# Patient Record
Sex: FEMALE | Race: WHITE | Employment: OTHER | URBAN - NONMETROPOLITAN AREA
[De-identification: names, ages, dates, MRNs, and addresses within clinical notes are randomized per-mention and may not be internally consistent; named-entity substitution may affect disease eponyms.]

---

## 2020-09-03 ENCOUNTER — HOSPITAL ENCOUNTER (OUTPATIENT)
Dept: MAMMOGRAPHY | Age: 60
Discharge: HOME | End: 2020-09-03
Payer: MEDICARE

## 2020-09-03 PROCEDURE — 77063 BREAST TOMOSYNTHESIS BI: CPT

## 2020-09-14 ENCOUNTER — HOSPITAL ENCOUNTER (OUTPATIENT)
Dept: WOMENS IMAGING | Age: 60
Discharge: HOME | End: 2020-09-14
Payer: MEDICARE

## 2020-09-14 PROCEDURE — 76642 ULTRASOUND BREAST LIMITED: CPT

## 2020-09-14 PROCEDURE — G0279 TOMOSYNTHESIS, MAMMO: HCPCS

## 2021-04-26 LAB
AVERAGE GLUCOSE: NORMAL
CHOLESTEROL, TOTAL: 159 MG/DL
CHOLESTEROL/HDL RATIO: 3.2
HBA1C MFR BLD: 5.4 %
HDLC SERPL-MCNC: 49 MG/DL (ref 35–70)
LDL CHOLESTEROL CALCULATED: 84 MG/DL (ref 0–160)
NONHDLC SERPL-MCNC: NORMAL MG/DL
TRIGL SERPL-MCNC: 128 MG/DL
VLDLC SERPL CALC-MCNC: 26 MG/DL

## 2022-06-15 LAB
ALBUMIN SERPL-MCNC: 4.7 G/DL
ALP BLD-CCNC: 87 U/L
ALT SERPL-CCNC: 15 U/L
ANION GAP SERPL CALCULATED.3IONS-SCNC: 13 MMOL/L
AST SERPL-CCNC: 18 U/L
BILIRUB SERPL-MCNC: 0.8 MG/DL (ref 0.1–1.4)
BUN BLDV-MCNC: 11 MG/DL
CALCIUM SERPL-MCNC: 9.3 MG/DL
CHLORIDE BLD-SCNC: 103 MMOL/L
CHOLESTEROL, TOTAL: 197 MG/DL
CHOLESTEROL/HDL RATIO: 3.4
CO2: 25 MMOL/L
CREAT SERPL-MCNC: 0.87 MG/DL
GFR CALCULATED: 72
GLUCOSE BLD-MCNC: 94 MG/DL
HDLC SERPL-MCNC: 58 MG/DL (ref 35–70)
LDL CHOLESTEROL CALCULATED: 116 MG/DL (ref 0–160)
NONHDLC SERPL-MCNC: NORMAL MG/DL
POTASSIUM SERPL-SCNC: 4.2 MMOL/L
SODIUM BLD-SCNC: 141 MMOL/L
TOTAL PROTEIN: 7
TRIGL SERPL-MCNC: 113 MG/DL
VLDLC SERPL CALC-MCNC: 23 MG/DL

## 2022-09-02 ENCOUNTER — HOSPITAL ENCOUNTER (OUTPATIENT)
Dept: MAMMOGRAPHY | Age: 62
Discharge: HOME | End: 2022-09-02
Payer: MEDICARE

## 2022-09-02 DIAGNOSIS — Z12.31 VISIT FOR SCREENING MAMMOGRAM: ICD-10-CM

## 2022-09-02 PROCEDURE — 77063 BREAST TOMOSYNTHESIS BI: CPT

## 2022-09-14 ENCOUNTER — HOSPITAL ENCOUNTER (OUTPATIENT)
Age: 62
Discharge: HOME | End: 2022-09-14
Payer: MEDICARE

## 2022-09-14 ENCOUNTER — HOSPITAL ENCOUNTER (OUTPATIENT)
Dept: GENERAL RADIOLOGY | Age: 62
Discharge: HOME | End: 2022-09-14
Payer: MEDICARE

## 2022-09-14 DIAGNOSIS — R52 PAIN: ICD-10-CM

## 2022-09-14 DIAGNOSIS — M79.672 LEFT FOOT PAIN: ICD-10-CM

## 2022-09-14 PROCEDURE — 73650 X-RAY EXAM OF HEEL: CPT

## 2023-01-04 ENCOUNTER — OFFICE VISIT (OUTPATIENT)
Dept: FAMILY MEDICINE CLINIC | Age: 63
End: 2023-01-04
Payer: MEDICARE

## 2023-01-04 VITALS
OXYGEN SATURATION: 95 % | TEMPERATURE: 97.5 F | SYSTOLIC BLOOD PRESSURE: 128 MMHG | WEIGHT: 143 LBS | RESPIRATION RATE: 10 BRPM | DIASTOLIC BLOOD PRESSURE: 82 MMHG | HEIGHT: 63 IN | HEART RATE: 81 BPM | BODY MASS INDEX: 25.34 KG/M2

## 2023-01-04 DIAGNOSIS — K59.01 SLOW TRANSIT CONSTIPATION: ICD-10-CM

## 2023-01-04 DIAGNOSIS — K90.89 OTHER INTESTINAL MALABSORPTION: ICD-10-CM

## 2023-01-04 DIAGNOSIS — R41.3 MEMORY DIFFICULTIES: ICD-10-CM

## 2023-01-04 DIAGNOSIS — G47.9 SLEEP DIFFICULTIES: ICD-10-CM

## 2023-01-04 DIAGNOSIS — H92.09 EAR ACHE: ICD-10-CM

## 2023-01-04 DIAGNOSIS — R53.83 TIRED: Primary | ICD-10-CM

## 2023-01-04 DIAGNOSIS — H53.9 VISION ABNORMALITIES: ICD-10-CM

## 2023-01-04 DIAGNOSIS — E78.89 LIPIDS ABNORMAL: ICD-10-CM

## 2023-01-04 DIAGNOSIS — T45.2X1S VITAMIN D OVERDOSE, ACCIDENTAL OR UNINTENTIONAL, SEQUELA: ICD-10-CM

## 2023-01-04 DIAGNOSIS — K90.89 OTHER SPECIFIED INTESTINAL MALABSORPTION: ICD-10-CM

## 2023-01-04 DIAGNOSIS — R09.81 SINUS CONGESTION: ICD-10-CM

## 2023-01-04 DIAGNOSIS — R79.89 LOW SERUM VITAMIN D: ICD-10-CM

## 2023-01-04 DIAGNOSIS — R52 ACHES: ICD-10-CM

## 2023-01-04 DIAGNOSIS — R73.09 LOW GLUCOSE LEVEL: ICD-10-CM

## 2023-01-04 DIAGNOSIS — Z71.89 ENCOUNTER FOR HERB AND VITAMIN SUPPLEMENT MANAGEMENT: ICD-10-CM

## 2023-01-04 PROCEDURE — G8427 DOCREV CUR MEDS BY ELIG CLIN: HCPCS | Performed by: FAMILY MEDICINE

## 2023-01-04 PROCEDURE — 3017F COLORECTAL CA SCREEN DOC REV: CPT | Performed by: FAMILY MEDICINE

## 2023-01-04 PROCEDURE — 99205 OFFICE O/P NEW HI 60 MIN: CPT | Performed by: FAMILY MEDICINE

## 2023-01-04 PROCEDURE — G8419 CALC BMI OUT NRM PARAM NOF/U: HCPCS | Performed by: FAMILY MEDICINE

## 2023-01-04 PROCEDURE — G8484 FLU IMMUNIZE NO ADMIN: HCPCS | Performed by: FAMILY MEDICINE

## 2023-01-04 PROCEDURE — 4004F PT TOBACCO SCREEN RCVD TLK: CPT | Performed by: FAMILY MEDICINE

## 2023-01-04 RX ORDER — HYDROXYZINE HYDROCHLORIDE 25 MG/1
25 TABLET, FILM COATED ORAL 3 TIMES DAILY PRN
COMMUNITY

## 2023-01-04 RX ORDER — BUPROPION HYDROCHLORIDE 150 MG/1
TABLET, EXTENDED RELEASE ORAL
COMMUNITY
Start: 2022-09-29

## 2023-01-04 RX ORDER — LISINOPRIL AND HYDROCHLOROTHIAZIDE 20; 12.5 MG/1; MG/1
TABLET ORAL
COMMUNITY
Start: 2022-09-27

## 2023-01-04 RX ORDER — VITAMIN B COMPLEX
1000 TABLET ORAL DAILY
COMMUNITY

## 2023-01-04 RX ORDER — GABAPENTIN 300 MG/1
300 CAPSULE ORAL 3 TIMES DAILY
COMMUNITY

## 2023-01-04 SDOH — ECONOMIC STABILITY: FOOD INSECURITY: WITHIN THE PAST 12 MONTHS, YOU WORRIED THAT YOUR FOOD WOULD RUN OUT BEFORE YOU GOT MONEY TO BUY MORE.: NEVER TRUE

## 2023-01-04 SDOH — ECONOMIC STABILITY: FOOD INSECURITY: WITHIN THE PAST 12 MONTHS, THE FOOD YOU BOUGHT JUST DIDN'T LAST AND YOU DIDN'T HAVE MONEY TO GET MORE.: NEVER TRUE

## 2023-01-04 ASSESSMENT — ENCOUNTER SYMPTOMS
BACK PAIN: 1
SINUS PAIN: 1
COUGH: 0
COLOR CHANGE: 1
SHORTNESS OF BREATH: 0
CONSTIPATION: 1
WHEEZING: 0

## 2023-01-04 ASSESSMENT — PATIENT HEALTH QUESTIONNAIRE - PHQ9
SUM OF ALL RESPONSES TO PHQ QUESTIONS 1-9: 0
2. FEELING DOWN, DEPRESSED OR HOPELESS: 0
1. LITTLE INTEREST OR PLEASURE IN DOING THINGS: 0
SUM OF ALL RESPONSES TO PHQ9 QUESTIONS 1 & 2: 0
SUM OF ALL RESPONSES TO PHQ QUESTIONS 1-9: 0

## 2023-01-04 ASSESSMENT — SOCIAL DETERMINANTS OF HEALTH (SDOH): HOW HARD IS IT FOR YOU TO PAY FOR THE VERY BASICS LIKE FOOD, HOUSING, MEDICAL CARE, AND HEATING?: NOT VERY HARD

## 2023-01-04 NOTE — PROGRESS NOTES
[address] [facility] [place] [address] [address]  Dept: [phone]  Dept Fax: [phone]  Loc: [phone]      Hemal Hyatt is a 65 y.o. White female. Zac Hernandes  presents to the [place] [address] clinic today for   Chief Complaint   Patient presents with    New Patient     WEE PROTOCOL- last labs at [facility]    Memory Loss    Congestion     sinuses    Anxiety    Depression    Sleep Problem     RLS   , and;   1. Tired    2. Memory difficulties    3. Sleep difficulties    4. Aches    5. Vision abnormalities    6. Encounter for herb and vitamin supplement management    7. Slow transit constipation    8. Sinus congestion    9. Ear ache    10. Lipids abnormal    11. Vitamin D overdose, accidental or unintentional, sequela    12. Other specified intestinal malabsorption    13. Low glucose level    14. Low serum vitamin D    15. Other intestinal malabsorption          I have reviewed Jennifer Gupta medical, surgical and other pertinent history in detail, and have updated medication and allergy information in the computerized patient record. Clinical Care Team:     -Referring Provider for today's consult: self  -Primary Care Provider: Rossy Bejarano MD    Medical/Surgical History:   She  has no past medical history on file. Her  has no past surgical history on file. Family/Social History:     Her family history is not on file. She  reports that she has been smoking cigarettes. She started smoking about 42 years ago. She has a 8.75 pack-year smoking history. She has never used smokeless tobacco. She reports that she does not currently use alcohol. She reports current drug use. Drug: Marijuana Juanetta Mahnomen).     Medications/Allergies/Immunizations:     Her current medication(s) include   Current Outpatient Medications:     buPROPion (WELLBUTRIN SR) 150 MG extended release tablet, take 1 tablet by mouth twice a day, Disp: , Rfl: lisinopril-hydroCHLOROthiazide (PRINZIDE;ZESTORETIC) 20-12.5 MG per tablet, take 1 tablet by mouth once daily, Disp: , Rfl:     gabapentin (NEURONTIN) 300 MG capsule, Take 300 mg by mouth 3 times daily. , Disp: , Rfl:     hydrOXYzine HCl (ATARAX) 25 MG tablet, Take 25 mg by mouth 3 times daily as needed for Itching, Disp: , Rfl:     Vitamin D (CHOLECALCIFEROL) 25 MCG (1000 UT) TABS tablet, Take 1,000 Units by mouth daily, Disp: , Rfl:   Allergies: Patient has no known allergies. Immunizations: There is no immunization history on file for this patient. History of Present Illness:     Jennifer's had concerns including New Patient (WEE PROTOCOL- last labs at [facility]), Memory Loss, Congestion (sinuses), Anxiety, Depression, and Sleep Problem (RLS). Emma White  presents to the [address] [place] [address] today for;   Chief Complaint   Patient presents with    New Patient     WEE PROTOCOL- last labs at [facility]    Memory Loss    Congestion     sinuses    Anxiety    Depression    Sleep Problem     RLS   , abnormal labs follow up and these conditions as she  Is looking today for:     1. Tired    2. Memory difficulties    3. Sleep difficulties    4. Aches    5. Vision abnormalities    6. Encounter for herb and vitamin supplement management    7. Slow transit constipation    8. Sinus congestion    9. Ear ache    10. Lipids abnormal    11. Vitamin D overdose, accidental or unintentional, sequela    12. Other specified intestinal malabsorption    13. Low glucose level    14. Low serum vitamin D    15. Other intestinal malabsorption      HPI    Subjective:     Review of Systems   HENT:  Positive for congestion and sinus pain. Eyes:  Positive for visual disturbance. Respiratory:  Negative for cough, shortness of breath and wheezing. Cardiovascular:  Positive for chest pain, palpitations and leg swelling. Gastrointestinal:  Positive for constipation. Endocrine: Negative.     Genitourinary:  Positive for frequency. Musculoskeletal:  Positive for back pain and myalgias. Skin:  Positive for color change. Allergic/Immunologic: Positive for environmental allergies. Neurological:  Positive for dizziness, weakness, light-headedness and numbness. Hematological:  Bruises/bleeds easily. Psychiatric/Behavioral:  Positive for decreased concentration, dysphoric mood and sleep disturbance. The patient is nervous/anxious. All other systems reviewed and are negative. Objective:     /82 (Site: Left Upper Arm, Position: Sitting, Cuff Size: Medium Adult)   Pulse 81   Temp 97.5 °F (36.4 °C) (Temporal)   Resp 10   Ht 5' 3\" (1.6 m)   Wt 143 lb (64.9 kg)   SpO2 95%   BMI 25.33 kg/m²   Physical Exam  Vitals and nursing note reviewed. Constitutional:       Appearance: Normal appearance. HENT:      Head: Normocephalic. Pulmonary:      Effort: Pulmonary effort is normal.   Neurological:      Mental Status: She is alert. Psychiatric:         Mood and Affect: Mood normal.         Thought Content: Thought content normal.          Laboratory Data:   Lab results were searched in Care Everywhere and/or those brought by the pateint were reviewed today with Jennifer and she has a copy of their most recent labs to take home with them as noted below;       Imaging Data:   Imaging Data:       Assessment & Plan:       Impression:  1. Tired    2. Memory difficulties    3. Sleep difficulties    4. Aches    5. Vision abnormalities    6. Encounter for herb and vitamin supplement management    7. Slow transit constipation    8. Sinus congestion    9. Ear ache    10. Lipids abnormal    11. Vitamin D overdose, accidental or unintentional, sequela    12. Other specified intestinal malabsorption    13. Low glucose level    14. Low serum vitamin D    15.  Other intestinal malabsorption      Assessment and Plan:  After reviewing the patients chief complaints, reviewing their labfindings in great detail (with the patient and those accompanying them) which correlate to their chief complaints, symptoms, and or medical conditions; suggestions were made relating to changes in diet and or supplements which may improve the complaints and which will be reflected in their future lab findings; Chief Complaint   Patient presents with    New Patient     WEE PROTOCOL- last labs at [facility] lab    Memory Loss    Congestion     sinuses    Anxiety    Depression    Sleep Problem     RLS   ;    Plans for the next visits:  - Abnormal and non-optimal Labs were ordered today to be repeated in the next 120-365 days to assess changes from adjustments in nutrition and or nutrients. - Patient instructed when having a blood draw to ask the  to divide their lab draws into multiple draws over several days if not feeling good at the time of the lab draw or if either prefers to do several smaller blood draws over several days  -Patient instructed to check with insurer before each lab draw and to go to the lab which the insurer directs them for the most cost effective lab draw with the least patient's cost  - Thedore Blade  will be scheduled subsequent to those results. - Thedore Blade will bring in her drink, food, supplement log to her next visit    Chronic Problems Addressed on this Visit:                                   1.  Intensity of Service; Uncontrolled items at this visit; Chief Complaint   Patient presents with    New Patient     WEE PROTOCOL- last labs at [facility] lab    Memory Loss    Congestion     sinuses    Anxiety    Depression    Sleep Problem     RLS   ; Improved items at this visit and Stable items were discussed at this visit;  2.  Patients food, drinks, supplements and symptoms were reviewed with the patient,       - Thedore Blade will bring food, drink, supplements and symptoms log to next visit for inclusion in their record      - 75 better food list reviewed & given to patient with the omega 6 food list to avoid      - The 52 Latex foods list was reviewed and given to the patients with the information on carrageenan         - Gluten in corn and oats abstracts sheet reviewed and given to the patient today   3. Greater than 60 minutes time was spent with the patient face to face on this visit; of which >50% was for counseling and coordination of care, as well as the time spent before and after the visit reviewing the chart, documenting the encounter, reviewing labs,reports, NIH listed studies, making phone calls, etc.      Patients food and drinks were reviewed with the patient,   - They will bring a food drink symptom log to future visits for inclusion in their record    - 75 better food list reviewed & given to patient along with the omega 6 food list to avoid      - Gluten in corn and oats abstracts sheet reviewed and given to the patient today    - 23 Foods containing Latex-like proteins was reviewed and copy to be taken if desired     - Nutrient Supplements list provided and copyto be taken if desired    - [id number]. [facility] web site offered to patient to review at their convenience by staff with login information    Note:  I have discussed with the patient that with all nutraceuticals, there is often mixed data and emerging research which needs to be monitored; as well as an array of NIH fact sheets on nutrients and supplements, available at www.nih,issue plus deeplocallabs. com plus www.lpi,org. If I have recommended cinnamon at the request of this patient to assist them in control of their blood sugar, triglyceride, and/or weight issues. I discussed that the patient's clinical use of cinnamon bark, calcium, magnesium, Vitamin D, and pharmaceutical grade [employer] omega 3 oil or triple-strength fish oil, and B-50/B-100 time-released B-complex by [address] [facility] will be for a time-limited trial to determine their individual effectiveness and safety in this patient.   I also referred the patient to the NMCD: Nutrition, Metabolism, and Cardiovascular Diseases (SecuritiesCard.pl) and concerns about long-term use and hepatotoxicity of cinnamon and other nutrients. I suggested they frequently search nih.gov for the latest non-proprietary information on nutriceuticals as well as consider a subscription to [employer] for details on reviewed supplements, or at the least review the nutrient files at [facility], [address] bark, an insulin mimetic, reduces some High Carbohydrate Dietary Impacts. Methylhydroxychalcone polymers insulin-enhancing properties in fat cells are responsible for enhanced glucose uptake, inhibiting hepatic HMG-CoA reductase and lowers lipids. www.jacn. org/content/20/4/327.full     But cinnamon with additives such as Cinnamon Extract are not effective as insulin mimetics.  :eStoreDirectory.at     Nutrients for Start up from [employer] or [employer] for ease to get started now;  Josie Dawkins has some useable products;  - Triple Strength Fish Oil, enteric coated  - Vit D-3 5000 IU gel caps  - Iron ferrous sulfate 325 mg tabs  - Centrum Silver look-a-like for most patients, or  - Centrum plain look-a-like if need iron    Local pharmacies or chains such as [facility], [place], have:  - [employer] pharmaceutical grade omega 3 is 90% EPA/DHA whereas most Triple strength fish oil are 75% EPA/DHA  - Triple Strength Fish Oil (enteric coated if available) or if not enteric coated, can take from freezer for less burps  - B-50 or B-100 released balanced B complex tabs by [address] [facility] bark 500 mg (without Chromium or extracts)   some brands list 1000 mg / serving of 2 capsules,    some brands have 1000 mg caps with the undesireable chromium extract  - Calcium carbonate/citrate, magnesium oxide/citrate, Vit D-3 as 3-4 tabs/caps/serving     Some Local Brands may contain Zinc which is acceptable for the first bottle or two  - Magnesium oxide 250 mg tabs for those having < 2 bowel movements daily  - Magnesium citrate 200 mg if having > 2 bowel movements/day  - Centrum Silver or look-a-like for most patients, Centrum plain or look-a-like with iron  - Vitamin D-3 comes as 1,000 IU or 2,000 IU or 5,000 IU gel caps or Liquid drops but keep Vitamin D levels <50 but >40     Some brands containing or derived from soy oil or corn oil are OK if not allergic to soy  - Elemental Iron 65 mg tabs at bedtime is available over the counter if need more iron     Usually turns bowel movements grey, green, or black but not a concern  - Apricot Kernel Oil (by Now) for dry skin sensitive perineal or perianal area skin    Nutrients for ongoing use by Mail order for less expense from [employer] ;  - Strength Fish Oil , 240 Softgels Item #[id number]  -B-100 time released balanced B complex Item #[id number]  - Cinnamon bark 500 mg without Chromium or extract Item #109636  - Calcium carbonate 1000 mg, Magnesium oxide 500 mg, Vit D-3 400 IU Item #[id number]  - Magnesium oxide 500 mg tabs Item #971346 if less than 2 bowel movements daily  - [employer] Sen[employer] Item #719483 for most patients, One Daily Item #809336 with iron  - Vit D 3  1,000 Item #266336      2,000 IU Item #990213   Item #925397     Some brands containing orderived from soy oil or corn oil are OK if not allergic to soy    Nutrients for Special Needs by Mail order for less expense from www. puritan.com;  -Elemental Iron 65 mg tabs Item #295185 if need more iron for low iron on labs    Usually turns bowel movements grey, green or black but not a concern  - Time released Niacin 250 mg Item #429861 for cold intolerance, low libido or impotence  - DHEA 50 mg Item #469219 for improving DHEA levels on labs if having Fatigue    If stools too loose substitute for your Magnesium oxide using;   Magnesium citrate 200 mg tabs (NOT liquid) at [employer]   Magnesium gluconate 550 mg by mLED at [employer] or [employer]  Magnesium chloride foot soaks or body sprays  www.Therative   Magnesium chloride flakes 14.99 Item #: [id number] if back-ordered, get spray  Magnesium threonate, Magtein also helps mental clarity and sleep    Food Drink Symptom Log;  I asked this patient to track these items and any other symptoms on their list on a weekly basis to documenttheir progress or lack of same. This can be done on the symptom tracking sheet I gave them at today's visit but looks like this:                                                      Rate on scale of 0-10 with zero = not noticeable  Symptom:                            Week 1               2                 3                 4               Etc            Hair loss    Foot cramps    Paresthesia    Aches    IBS (irritable bowel)    Constipation    Diarrhea  Nocturia (up to bathroom at night)    Fatigue/Energy level  Stress      On the other side of the sheet they can track their food, drink, environment, activity, symptoms etc      Avoiding Latex-like proteins in my foods; Avocados, Bananas, Celery, Figs & Kiwi proteins have latex-like proteins to inflame our immune systems, plus 47 more foods  How Can I Have A Latex Allergy? Eating foods with latex-like protein exposes us to latex allergies. Our body cannot tell the differencebetween these latex-like proteins and latex from rubber products since many people are allergic to fruit, vegetables and latex. Read labels on pre-packaged foods. This list to avoid is only a guide if you are known allergicto latex or have a latex rash on your chin, cheeks and lines on your neck and chest. The amount of latex is different in each food product or fruit variety. Avoid out of Season if not grown locally:   Melon, Nectarine, Papaya, Cherry, Passion fruit, Plum, Chestnuts, and Tomato. Avocado, Banana, Celery, Figs, and Kiwi always contain Latex-like protein. Whats in Season?   Strawberries taste better in June than December because June is strawberry season so buy locally grown produce \"in season\" for the best flavor, cost, and less Latex. Locally grown produce not only tastes great but also requires little or no ethylene exposure in food distribution so has less latex content. Out of season: use canned, frozen, or dried since those are processed ripe and latex content is lower!!!     Month     [place] Locally Grown Produce  January, February, March: use canned, frozen or dried fruits since lower in latex  April: asparagus, radishes  May: asparagus, broccoli, green onions, greens, peas, radishes, rhubarb  Fabienne: asparagus, beets, beans, broccoli, cabbage, cantaloupe, carrots, green onions, greens, lettuce, onions, parsley, peas, radishes, rhubarb, strawberries, watermelons  July: beans, beets, blueberries, broccoli, cabbage, cantaloupe, carrots, cauliflower, celery, cucumbers, eggplant, grapes, green onions, greens, lettuce, onions, parsley, peas, peaches, bell peppers, potatoes, radishes, summer raspberries, squash, sweetcorn, tomatoes, turnips, watermelons  August: apples, beans, beets, blueberries, cabbage, cantaloupe, carrots, cauliflower, celery, cucumbers, eggplant, grapes, green onions, greens, lettuce, onions, parsley, peas, peaches, pears, bell peppers, potatoes, radishes, squash, sweet corn, tomatoes, turnips, watermelons  September: apples, beans, beets, blueberries, cabbage, cantaloupe, carrots, cauliflower, celery, cucumbers, eggplant, grapes, green onions, greens, lettuce, onions, parsley, peas, peaches, pears, bell peppers, plums, potatoes, pumpkins, radishes, fall red raspberries, squash, sweet corn, tomatoes, turnips, watermelons  October: apples, beets, broccoli, cabbage, carrots, cauliflower, celery, green onions, greens, lettuce, parsley, peas, pears, potatoes, pumpkins, radishes, fall red raspberries, squash, turnips  November: broccoli, cabbage, carrots, parsley, pears, peas  December: use canned, frozen or dried fruits since lower in latex    Upto half of latex-sensitive patients show allergic reactions to fruits (avocados, bananas, kiwifruits, papayas, peaches),   Annals of Allergy, 1994. These plants contain the same proteins that are allergens in latex. People with fruit allergies should warn physicians before undergoing procedures which may cause anaphylactic reaction if in contact with latex gloves. Some of the common foods with defined cross-reactivity to latex are avocado, banana, kiwi, chestnut, raw potato, tomato, stone fruits (e.g., peach, cherry), hazelnut, melons, celery, carrot, apple, pear, papaya, and almond. Foods with less well-defined cross-reactivity to latex are peanuts, peppers, citrus fruits, coconut, pineapple, kait, fig, passion fruit, Ugli fruit, and grape. This fruit/latex cross-reactivity is worsened by ethylene, a gas used to hasten commercial ripening. In nature, plants produce low levels of the hormone ethylene, which regulates germination, flowering, and ripening. Forced ripening by high ethylene concentrations, plants produce allergenic wound-repair proteins, which are similar to wound-repair proteins made during the tapping of rubber trees. Sensitive individuals who ingest the fruit get a higher dose and worse reaction. Some people may even first become sensitized to latex through fruit. Can food processing increase the concentrations of allergenic proteins? Latex-sensitized children (and adults) in [place] often experience allergic reactions after eating bananas ripened artificially with ethylene. In the [facility], food distribution centers treat unripe bananas and other produce with ethylene to ripen; not commonly done in [facility] since fruit is tree-ripened there. Does treatment of food with ethylene induce banana proteins that cross-react with latex?  (Marcia et al.)    References:   Latex in Foods Allergy, http://ehp.niehs.nih.gov/members/2003/5811/5811.html    Search web for \" Whats in Season \" for where you live or are at the time you food shop   Management of Latex, ://medicalcenter. o[employer].edu/  search for nih, latex-like proteins in foods

## 2023-01-04 NOTE — PATIENT INSTRUCTIONS
Thank you   Thank you for trusting us with your healthcare needs. You may receive a survey regarding today's visit. It would help us out if you would take a few moments to provide your feedback. We value your input. Please bring in ALL medications BOTTLES, including inhalers, herbal supplements, over the counter, prescribed & non-prescribed medicine. The office would like actual medication bottles and a list.   Please note our OFFICE POLICIES:   Prior to getting your labs drawn, please check with your insurance company for benefits and eligibility of lab services. Often, insurance companies cover certain tests for preventative visits only. It is patient's responsibility to see what is covered. We are unable to change a diagnosis after the test has been performed. Lab orders will not be re-printed. Please hold onto your original lab orders and take them to your lab to be completed. If you no show your scheduled appointment three times, you will be dismissed from this practice. Reschedules must be completed 24 hours prior to your schedule appointment. If the list below has been completed, PLEASE FAX RECORDS TO OUR OFFICE @ [phone].  Once the records have been received we will update your records at our office:  Health Maintenance Due   Topic Date Due    COVID-19 Vaccine (1) Never done    Pneumococcal 0-64 years Vaccine (1 - PCV) Never done    Depression Screen  Never done    HIV screen  Never done    Hepatitis C screen  Never done    DTaP/Tdap/Td vaccine (1 - Tdap) Never done    Cervical cancer screen  Never done    Colorectal Cancer Screen  Never done    Shingles vaccine (1 of 2) Never done    Annual Wellness Visit (AWV)  Never done    Flu vaccine (1) Never done

## 2023-01-11 LAB
AVERAGE GLUCOSE: 108
HBA1C MFR BLD: 5.4 %

## 2023-02-01 ENCOUNTER — OFFICE VISIT (OUTPATIENT)
Dept: FAMILY MEDICINE CLINIC | Age: 63
End: 2023-02-01
Payer: MEDICARE

## 2023-02-01 ENCOUNTER — PATIENT MESSAGE (OUTPATIENT)
Dept: FAMILY MEDICINE CLINIC | Age: 63
End: 2023-02-01

## 2023-02-01 VITALS
RESPIRATION RATE: 12 BRPM | WEIGHT: 145.2 LBS | DIASTOLIC BLOOD PRESSURE: 76 MMHG | HEART RATE: 81 BPM | SYSTOLIC BLOOD PRESSURE: 136 MMHG | TEMPERATURE: 97.5 F | OXYGEN SATURATION: 96 % | BODY MASS INDEX: 25.73 KG/M2 | HEIGHT: 63 IN

## 2023-02-01 DIAGNOSIS — T45.2X1S VITAMIN D OVERDOSE, ACCIDENTAL OR UNINTENTIONAL, SEQUELA: ICD-10-CM

## 2023-02-01 DIAGNOSIS — G47.9 SLEEP DIFFICULTIES: ICD-10-CM

## 2023-02-01 DIAGNOSIS — R79.9 ABNORMAL BLOOD FINDINGS: ICD-10-CM

## 2023-02-01 DIAGNOSIS — E61.2: ICD-10-CM

## 2023-02-01 DIAGNOSIS — H53.9 VISION ABNORMALITIES: ICD-10-CM

## 2023-02-01 DIAGNOSIS — K90.89 OTHER SPECIFIED INTESTINAL MALABSORPTION: ICD-10-CM

## 2023-02-01 DIAGNOSIS — R53.83 TIRED: Primary | ICD-10-CM

## 2023-02-01 DIAGNOSIS — R41.3 MEMORY DIFFICULTIES: ICD-10-CM

## 2023-02-01 DIAGNOSIS — E78.89 LIPIDS ABNORMAL: Primary | ICD-10-CM

## 2023-02-01 DIAGNOSIS — K59.01 SLOW TRANSIT CONSTIPATION: ICD-10-CM

## 2023-02-01 DIAGNOSIS — Z71.89 ENCOUNTER FOR HERB AND VITAMIN SUPPLEMENT MANAGEMENT: ICD-10-CM

## 2023-02-01 DIAGNOSIS — R52 ACHES: ICD-10-CM

## 2023-02-01 DIAGNOSIS — E78.89 LIPIDS ABNORMAL: ICD-10-CM

## 2023-02-01 PROCEDURE — G8484 FLU IMMUNIZE NO ADMIN: HCPCS | Performed by: FAMILY MEDICINE

## 2023-02-01 PROCEDURE — 4004F PT TOBACCO SCREEN RCVD TLK: CPT | Performed by: FAMILY MEDICINE

## 2023-02-01 PROCEDURE — G8427 DOCREV CUR MEDS BY ELIG CLIN: HCPCS | Performed by: FAMILY MEDICINE

## 2023-02-01 PROCEDURE — 99213 OFFICE O/P EST LOW 20 MIN: CPT | Performed by: FAMILY MEDICINE

## 2023-02-01 PROCEDURE — 3017F COLORECTAL CA SCREEN DOC REV: CPT | Performed by: FAMILY MEDICINE

## 2023-02-01 PROCEDURE — G8419 CALC BMI OUT NRM PARAM NOF/U: HCPCS | Performed by: FAMILY MEDICINE

## 2023-02-01 RX ORDER — CHLORAL HYDRATE 500 MG
1 CAPSULE ORAL
COMMUNITY

## 2023-02-01 RX ORDER — NAPROXEN 500 MG/1
TABLET ORAL DAILY PRN
COMMUNITY
Start: 2023-01-09

## 2023-02-01 SDOH — ECONOMIC STABILITY: HOUSING INSECURITY
IN THE LAST 12 MONTHS, WAS THERE A TIME WHEN YOU DID NOT HAVE A STEADY PLACE TO SLEEP OR SLEPT IN A SHELTER (INCLUDING NOW)?: NO

## 2023-02-01 SDOH — ECONOMIC STABILITY: INCOME INSECURITY: HOW HARD IS IT FOR YOU TO PAY FOR THE VERY BASICS LIKE FOOD, HOUSING, MEDICAL CARE, AND HEATING?: NOT VERY HARD

## 2023-02-01 SDOH — ECONOMIC STABILITY: FOOD INSECURITY: WITHIN THE PAST 12 MONTHS, YOU WORRIED THAT YOUR FOOD WOULD RUN OUT BEFORE YOU GOT MONEY TO BUY MORE.: NEVER TRUE

## 2023-02-01 SDOH — ECONOMIC STABILITY: FOOD INSECURITY: WITHIN THE PAST 12 MONTHS, THE FOOD YOU BOUGHT JUST DIDN'T LAST AND YOU DIDN'T HAVE MONEY TO GET MORE.: NEVER TRUE

## 2023-02-01 ASSESSMENT — ENCOUNTER SYMPTOMS: EYE REDNESS: 1

## 2023-02-01 NOTE — PROGRESS NOTES
[address] [facility] [place] [address] [address]  Dept: [phone]  Dept Fax: [phone]  Loc: [phone]      Omayra Waller is a 65 y.o. White female. Shanel Potter  presents to the [place] [address] clinic today for   Chief Complaint   Patient presents with    Discuss Labs    Follow-up   , and;   1. Tired    2. Memory difficulties    3. Sleep difficulties    4. Aches    5. Vision abnormalities    6. Encounter for herb and vitamin supplement management    7. Slow transit constipation    8. Lipids abnormal    9. Vitamin D overdose, accidental or unintentional, sequela    10. Other specified intestinal malabsorption    11. Inadequate magnesium intake          I have reviewed Jennifer Gupta medical, surgical and other pertinent history in detail, and have updated medication and allergy information in the computerized patient record. Clinical Care Team:     -Referring Provider for today's consult: self  -Primary Care Provider: Laila Borden MD    Medical/Surgical History:   She  has no past medical history on file. Her  has no past surgical history on file. Family/Social History:     Her family history is not on file. She  reports that she has been smoking cigarettes. She started smoking about 42 years ago. She has a 8.75 pack-year smoking history. She has never used smokeless tobacco. She reports that she does not currently use alcohol. She reports current drug use. Drug: Marijuana Marcio Salazar).     Medications/Allergies/Immunizations:     Her current medication(s) include   Current Outpatient Medications:     naproxen (NAPROSYN) 500 MG tablet, daily as needed, Disp: , Rfl:     Omega-3 1000 MG CAPS, Take 1 capsule by mouth 4 times daily (before meals and nightly) [facility] omega  = anxiety, depression, BP, stress, Disp: , Rfl:     buPROPion (WELLBUTRIN SR) 150 MG extended release tablet, take 1 tablet by mouth twice a day, Disp: , Rfl:     lisinopril-hydroCHLOROthiazide (PRINZIDE;ZESTORETIC) 20-12.5 MG per tablet, take 1 tablet by mouth once daily, Disp: , Rfl:     gabapentin (NEURONTIN) 300 MG capsule, Take 300 mg by mouth 3 times daily. , Disp: , Rfl:     hydrOXYzine HCl (ATARAX) 25 MG tablet, Take 25 mg by mouth 3 times daily as needed for Itching, Disp: , Rfl:     Vitamin D (CHOLECALCIFEROL) 25 MCG (1000 UT) TABS tablet, Take 1,000 Units by mouth daily, Disp: , Rfl:   Allergies: Patient has no known allergies. Immunizations: There is no immunization history on file for this patient. History of Present Illness:     Jennifer's had concerns including Discuss Labs and Follow-up. Wes Campa  presents to the [address] [place] [address] today for;   Chief Complaint   Patient presents with    Discuss Labs    Follow-up   , abnormal labs follow up and these conditions as she  Is looking today for:     1. Tired    2. Memory difficulties    3. Sleep difficulties    4. Aches    5. Vision abnormalities    6. Encounter for herb and vitamin supplement management    7. Slow transit constipation    8. Lipids abnormal    9. Vitamin D overdose, accidental or unintentional, sequela    10. Other specified intestinal malabsorption    11. Inadequate magnesium intake      HPI    Subjective:     Review of Systems   HENT:  Positive for congestion. Eyes:  Positive for redness. All other systems reviewed and are negative. Objective:     /76 (Site: Left Upper Arm, Position: Sitting, Cuff Size: Medium Adult)   Pulse 81   Temp 97.5 °F (36.4 °C) (Temporal)   Resp 12   Ht 5' 3\" (1.6 m)   Wt 145 lb 3.2 oz (65.9 kg)   SpO2 96%   BMI 25.72 kg/m²   Physical Exam  Vitals and nursing note reviewed.           Laboratory Data:   Lab results were searched in Care Everywhere and/or those brought by the pateint were reviewed today with Jennifer and she has a copy of their most recent labs to take home with them as noted below;       Imaging Data: Imaging Data:       Assessment & Plan:       Impression:  1. Tired    2. Memory difficulties    3. Sleep difficulties    4. Aches    5. Vision abnormalities    6. Encounter for herb and vitamin supplement management    7. Slow transit constipation    8. Lipids abnormal    9. Vitamin D overdose, accidental or unintentional, sequela    10. Other specified intestinal malabsorption    11. Inadequate magnesium intake      Assessment and Plan:  After reviewing the patients chief complaints, reviewing their labfindings in great detail (with the patient and those accompanying them) which correlate to their chief complaints, symptoms, and or medical conditions; suggestions were made relating to changes in diet and or supplements which may improve the complaints and which will be reflected in their future lab findings; Chief Complaint   Patient presents with    Discuss Labs    Follow-up   ;    Plans for the next visits:  - Abnormal and non-optimal Labs were ordered today to be repeated in the next 120-365 days to assess changes from adjustments in nutrition and or nutrients. - Patient instructed when having a blood draw to ask the  to divide their lab draws into multiple draws over several days if not feeling good at the time of the lab draw or if either prefers to do several smaller blood draws over several days  -Patient instructed to check with insurer before each lab draw and to go to the lab which the insurer directs them for the most cost effective lab draw with the least patient's cost  - Rose Marie Hillman  will be scheduled subsequent to those results. - Rose Marie Hillman will bring in her drink, food, supplement log to her next visit    Chronic Problems Addressed on this Visit:                                   1.  Intensity of Service; Uncontrolled items at this visit; Chief Complaint   Patient presents with    Discuss Labs    Follow-up   ;               Improved items at this visit and Stable items were discussed at this visit; 2. Patients food, drinks, supplements and symptoms were reviewed with the patient,       - Valeri Quigley will bring food, drink, supplements and symptoms log to next visit for inclusion in their record      - 75 better food list reviewed & given to patient with the omega 6 food list to avoid      - The 52 Latex foods list was reviewed and given to the patients with the information on carrageenan         - Gluten in corn and oats abstracts sheet reviewed and given to the patient today   3. Greater than 22 minutes time was spent with the patient face to face on this visit; of which >50% was for counseling and coordination of care, as well as the time spent before and after the visit reviewing the chart, documenting the encounter, reviewing labs,reports, NIH listed studies, making phone calls, etc.      Patients food and drinks were reviewed with the patient,   - They will bring a food drink symptom log to future visits for inclusion in their record    - 75 better food list reviewed & given to patient along with the omega 6 food list to avoid      - Gluten in corn and oats abstracts sheet reviewed and given to the patient today    - 23 Foods containing Latex-like proteins was reviewed and copy to be taken if desired     - Nutrient Supplements list provided and copyto be taken if desired    - [employer] web site offered to patient to review at their convenience by staff with login information    Note:  I have discussed with the patient that with all nutraceuticals, there is often mixed data and emerging research which needs to be monitored; as well as an array of NIH fact sheets on nutrients and supplements, available at www.nih,issue plus [employer]. [employer] plus www.lpi,org. If I have recommended cinnamon at the request of this patient to assist them in control of their blood sugar, triglyceride, and/or weight issues.   I discussed that the patient's clinical use of cinnamon bark, calcium, magnesium, Vitamin D, and pharmaceutical grade [facility] omega 3 oil or triple-strength fish oil, and B-50/B-100 time-released B-complex by [address] will be for a time-limited trial to determine their individual effectiveness and safety in this patient. I also referred the patient to the NMCD: Nutrition, Metabolism, and Cardiovascular Diseases (SecuritiesCard.pl) and concerns about long-term use and hepatotoxicity of cinnamon and other nutrients. I suggested they frequently search nih.gov for the latest non-proprietary information on nutriceuticals as well as consider a subscription to [employer] (ROOF) for details on reviewed supplements, or at the least review the nutrient files at [facility] at [facility], [address] bark, an insulin mimetic, reduces some High Carbohydrate Dietary Impacts. Methylhydroxychalcone polymers insulin-enhancing properties in fat cells are responsible for enhanced glucose uptake, inhibiting hepatic HMG-CoA reductase and lowers lipids. www.jacn. org/content/20/4/327.full     But cinnamon with additives such as Cinnamon Extract are not effective as insulin mimetics.  :eStoreDirectory.at     Nutrients for Start up from [employer] or [employer] for ease to get started now;  Josie Dawkins has some useable products;  - Triple Strength Fish Oil, enteric coated  - Vit D-3 5000 IU gel caps  - Iron ferrous sulfate 325 mg tabs  - Centrum Silver look-a-like for most patients, or  - Centrum plain look-a-like if need iron    Local pharmacies or chains such as [employer], [employer], [employer], have:  - [employer] pharmaceutical grade omega 3 is 90% EPA/DHA whereas most Triple strength fish oil are 75% EPA/DHA  - Triple Strength Fish Oil (enteric coated if available) or if not enteric coated, can take from freezer for less burps  - B-50 or B-100 released balanced B complex tabs by [address] at [facility] bark 500 mg (without Chromium or extracts)   some brands list 1000 mg / serving of 2 capsules,    some brands have 1000 mg caps with the undesireable chromium extract  - Calcium carbonate/citrate, magnesium oxide/citrate, Vit D-3 as 3-4 tabs/caps/serving     Some Local Brands may contain Zinc which is acceptable for the first bottle or two  - Magnesium oxide 250 mg tabs for those having < 2 bowel movements daily  - Magnesium citrate 200 mg if having > 2 bowel movements/day  - Centrum Silver or look-a-like for most patients, Centrum plain or look-a-like with iron  - Vitamin D-3 comes as 1,000 IU or 2,000 IU or 5,000 IU gel caps or Liquid drops but keep Vitamin D levels <50 but >40     Some brands containing or derived from soy oil or corn oil are OK if not allergic to soy  - Elemental Iron 65 mg tabs at bedtime is available over the counter if need more iron     Usually turns bowel movements grey, green, or black but not a concern  - Apricot Kernel Oil (by Now) for dry skin sensitive perineal or perianal area skin    Nutrients for ongoing use by Mail order for less expense from [employer] ;  - Strength Fish Oil , 240 Softgels Item #[id number]  -B-100 time released balanced B complex Item #[id number]  - Cinnamon bark 500 mg without Chromium or extract Item #[id number]  - Calcium carbonate 1000 mg, Magnesium oxide 500 mg, Vit D-3 400 IU Item #[id number]  - Magnesium oxide 500 mg tabs Item #[id number] if less than 2 bowel movements daily  - [employer] Item #[id number] for most patients, One Daily Item #241566 with iron  - Vit D 3  1,000 Item #[id number]      2,000 IU Item #987077   Item #433659     Some brands containing orderived from soy oil or corn oil are OK if not allergic to soy    Nutrients for Special Needs by Mail order for less expense from www. puritan.com;  -Elemental Iron 65 mg tabs Item #687529 if need more iron for low iron on labs    Usually turns bowel movements grey, green or black but not a concern  - Time released Niacin 250 mg Item #551740 for cold intolerance, low libido or impotence  - DHEA 50 mg Item #652454 for improving DHEA levels on labs if having Fatigue    If stools too loose substitute for your Magnesium oxide using;   Magnesium citrate 200 mg tabs (NOT liquid) at [employer]. [place]   Magnesium gluconate 550 mg by Nicolasa at [employer] or [employer]Yeti. com  Magnesium chloride foot soaks or body sprays  www.ideacts innovations. [place]   Magnesium chloride flakes 14.99 Item #: [id number] if back-ordered, get spray  Magnesium threonate, Magtein also helps mental clarity and sleep    Food Drink Symptom Log;  I asked this patient to track these items and any other symptoms on their list on a weekly basis to documenttheir progress or lack of same. This can be done on the symptom tracking sheet I gave them at today's visit but looks like this:                                                      Rate on scale of 0-10 with zero = not noticeable  Symptom:                            Week 1               2                 3                 4               Etc            Hair loss    Foot cramps    Paresthesia    Aches    IBS (irritable bowel)    Constipation    Diarrhea  Nocturia (up to bathroom at night)    Fatigue/Energy level  Stress      On the other side of the sheet they can track their food, drink, environment, activity, symptoms etc      Avoiding Latex-like proteins in my foods; Avocados, Bananas, Celery, Figs & Kiwi proteins have latex-like proteins to inflame our immune systems, plus 47 more foods  How Can I Have A Latex Allergy? Eating foods with latex-like protein exposes us to latex allergies. Our body cannot tell the differencebetween these latex-like proteins and latex from rubber products since many people are allergic to fruit, vegetables and latex. Read labels on pre-packaged foods.  This list to avoid is only a guide if you are known allergicto latex or have a latex rash on your chin, cheeks and lines on your neck and chest. The amount of latex is different in each food product or fruit variety. Avoid out of Season if not grown locally:   Melon, Nectarine, Papaya, Cherry, Passion fruit, Plum, Chestnuts, and Tomato. Avocado, Banana, Celery, Figs, and Kiwi always contain Latex-like protein. Whats in Season? Strawberries taste better in June than December because June is strawberry season so buy locally grown produce \"in season\" for the best flavor, cost, and less Latex. Locally grown produce not only tastes great but also requires little or no ethylene exposure in food distribution so has less latex content. Out of season: use canned, frozen, or dried since those are processed ripe and latex content is lower!!!     Month     [place] Locally Grown Produce  January, February, March: use canned, frozen or dried fruits since lower in latex  April: asparagus, radishes  May: asparagus, broccoli, green onions, greens, peas, radishes, rhubarb  Fabienne: asparagus, beets, beans, broccoli, cabbage, cantaloupe, carrots, green onions, greens, lettuce, onions, parsley, peas, radishes, rhubarb, strawberries, watermelons  July: beans, beets, blueberries, broccoli, cabbage, cantaloupe, carrots, cauliflower, celery, cucumbers, eggplant, grapes, green onions, greens, lettuce, onions, parsley, peas, peaches, bell peppers, potatoes, radishes, summer raspberries, squash, sweetcorn, tomatoes, turnips, watermelons  August: apples, beans, beets, blueberries, cabbage, cantaloupe, carrots, cauliflower, celery, cucumbers, eggplant, grapes, green onions, greens, lettuce, onions, parsley, peas, peaches, pears, bell peppers, potatoes, radishes, squash, sweet corn, tomatoes, turnips, watermelons  September: apples, beans, beets, blueberries, cabbage, cantaloupe, carrots, cauliflower, celery, cucumbers, eggplant, grapes, green onions, greens, lettuce, onions, parsley, peas, peaches, pears, bell peppers, plums, potatoes, pumpkins, radishes, fall red raspberries, squash, sweet corn, tomatoes, turnips, watermelons  October: apples, beets, broccoli, cabbage, carrots, cauliflower, celery, green onions, greens, lettuce, parsley, peas, pears, potatoes, pumpkins, radishes, fall red raspberries, squash, turnips  November: broccoli, cabbage, carrots, parsley, pears, peas  December: use canned, frozen or dried fruits since lower in latex    Upto half of latex-sensitive patients show allergic reactions to fruits (avocados, bananas, kiwifruits, papayas, peaches),   Annals of Allergy, 1994. These plants contain the same proteins that are allergens in latex. People with fruit allergies should warn physicians before undergoing procedures which may cause anaphylactic reaction if in contact with latex gloves. Some of the common foods with defined cross-reactivity to latex are avocado, banana, kiwi, chestnut, raw potato, tomato, stone fruits (e.g., peach, cherry), hazelnut, melons, celery, carrot, apple, pear, papaya, and almond. Foods with less well-defined cross-reactivity to latex are peanuts, peppers, citrus fruits, coconut, pineapple, kait, fig, passion fruit, Ugli fruit, and grape. This fruit/latex cross-reactivity is worsened by ethylene, a gas used to hasten commercial ripening. In nature, plants produce low levels of the hormone ethylene, which regulates germination, flowering, and ripening. Forced ripening by high ethylene concentrations, plants produce allergenic wound-repair proteins, which are similar to wound-repair proteins made during the tapping of rubber trees. Sensitive individuals who ingest the fruit get a higher dose and worse reaction. Some people may even first become sensitized to latex through fruit. Can food processing increase the concentrations of allergenic proteins? Latex-sensitized children (and adults) in [place] often experience allergic reactions after eating bananas ripened artificially with ethylene.  In the [place], food distribution centers treat unripe bananas and other produce with ethylene to ripen; not commonly done in [facility] since fruit is tree-ripened there. Does treatment of food with ethylene induce banana proteins that cross-react with latex? (Marcia et al.)    References:   Latex in Foods Allergy, http://ehp.niehs.nih.gov/members/2003/5811/5811.html    Search web for [employer] in Season \" for where you live or are at the time you food shop   Management of Latex, ://medicalcenter. [facility].edu/  search for nih, latex-like proteins in foods

## 2023-02-01 NOTE — PATIENT INSTRUCTIONS
Thank you   Thank you for trusting us with your healthcare needs. You may receive a survey regarding today's visit. It would help us out if you would take a few moments to provide your feedback. We value your input. Please bring in ALL medications BOTTLES, including inhalers, herbal supplements, over the counter, prescribed & non-prescribed medicine. The office would like actual medication bottles and a list.   Please note our OFFICE POLICIES:   Prior to getting your labs drawn, please check with your insurance company for benefits and eligibility of lab services. Often, insurance companies cover certain tests for preventative visits only. It is patient's responsibility to see what is covered. We are unable to change a diagnosis after the test has been performed. Lab orders will not be re-printed. Please hold onto your original lab orders and take them to your lab to be completed. If you no show your scheduled appointment three times, you will be dismissed from this practice. Reschedules must be completed 24 hours prior to your schedule appointment. If the list below has been completed, PLEASE FAX RECORDS TO OUR OFFICE @ [phone].  Once the records have been received we will update your records at our office:  Health Maintenance Due   Topic Date Due    COVID-19 Vaccine (1) Never done    Pneumococcal 0-64 years Vaccine (1 - PCV) Never done    HIV screen  Never done    Hepatitis C screen  Never done    DTaP/Tdap/Td vaccine (1 - Tdap) Never done    Cervical cancer screen  Never done    Colorectal Cancer Screen  Never done    Shingles vaccine (1 of 2) Never done    Annual Wellness Visit (AWV)  Never done    Flu vaccine (1) Never done

## 2023-02-20 NOTE — TELEPHONE ENCOUNTER
[facility] fish oil will not be filled at [facility] aid where patient wants to go. Please change to a fish oil that is appropriate.

## 2023-02-21 RX ORDER — VITAMIN B COMPLEX
1000 TABLET ORAL DAILY
Qty: 60 TABLET | Refills: 3 | Status: SHIPPED | OUTPATIENT
Start: 2023-02-21

## 2023-04-18 LAB — SEDIMENTATION RATE, ERYTHROCYTE: 16 MM/HR (ref 0–20)

## 2023-04-19 LAB
ABSOLUTE BASO #: 0.06 K/UL (ref 0–0.2)
ABSOLUTE EOS #: 0.08 K/UL (ref 0–0.5)
ABSOLUTE LYMPH #: 1.49 K/UL (ref 1–4)
ABSOLUTE MONO #: 0.54 K/UL (ref 0.2–1)
ABSOLUTE NEUT #: 3.68 K/UL (ref 1.5–7.5)
BASOPHILS RELATIVE PERCENT: 1 %
CALCIUM SERPL-MCNC: 9.5 MG/DL (ref 8.5–10.5)
EOSINOPHILS RELATIVE PERCENT: 1.4 %
ESTRADIOL LEVEL: <25 PG/ML
HCT VFR BLD CALC: 41.1 % (ref 34–45)
HEMOGLOBIN: 14.1 G/DL (ref 11.5–15.5)
HIGH SENSITIVE C-REACTIVE PROTEIN: 0.9 MG/L
LYMPHOCYTE %: 25.4 %
MAGNESIUM: 2.2 MG/DL (ref 1.6–2.6)
MCH RBC QN AUTO: 29.6 PG (ref 25–33)
MCHC RBC AUTO-ENTMCNC: 34.3 G/DL (ref 31–36)
MCV RBC AUTO: 86.3 FL (ref 80–99)
MONOCYTES # BLD: 9.2 %
NEUTROPHILS RELATIVE PERCENT: 62.8 %
PDW BLD-RTO: 12.4 % (ref 11.5–15)
PLATELETS: 182 K/UL (ref 130–400)
PMV BLD AUTO: 10 FL (ref 9.3–13)
PROGESTERONE LEVEL: 0.29 NG/ML
RBC: 4.76 M/UL (ref 3.8–5.4)
VITAMIN D 25-HYDROXY: 57 NG/ML
WBC: 5.9 K/UL (ref 3.5–11)

## 2023-04-20 LAB
DHEAS (DHEA SULFATE): 43 UG/DL (ref 19–205)
PARATHYROID HORMONE INTACT: 27 PG/ML (ref 15–65)
THYROID PEROXIDASE ANTIBODY: 2 IU/ML

## 2023-04-21 LAB — DEHYDROEPIANDROSTERONE: 9 NG/ML (ref 1.3–9.8)

## 2023-04-22 LAB
ALBUMIN SERUM: 4.9 G/DL (ref 3.6–5.1)
SEX HORMONE BINDING GLOBULIN: 77 NMOL/L (ref 17.3–125)
TESTOSTERONE FREE: 0.5 PG/ML (ref 0.6–3.8)
TESTOSTERONE, LCMS: 5 NG/DL (ref 5–32)

## 2023-05-01 ENCOUNTER — OFFICE VISIT (OUTPATIENT)
Dept: FAMILY MEDICINE CLINIC | Age: 63
End: 2023-05-01
Payer: MEDICARE

## 2023-05-01 VITALS
HEIGHT: 63 IN | HEART RATE: 83 BPM | TEMPERATURE: 98.2 F | DIASTOLIC BLOOD PRESSURE: 80 MMHG | BODY MASS INDEX: 25.8 KG/M2 | RESPIRATION RATE: 10 BRPM | SYSTOLIC BLOOD PRESSURE: 138 MMHG | WEIGHT: 145.6 LBS | OXYGEN SATURATION: 94 %

## 2023-05-01 DIAGNOSIS — Z71.89 ENCOUNTER FOR HERB AND VITAMIN SUPPLEMENT MANAGEMENT: ICD-10-CM

## 2023-05-01 DIAGNOSIS — H53.9 VISION ABNORMALITIES: ICD-10-CM

## 2023-05-01 DIAGNOSIS — T45.2X1S VITAMIN D OVERDOSE, ACCIDENTAL OR UNINTENTIONAL, SEQUELA: ICD-10-CM

## 2023-05-01 DIAGNOSIS — K90.89 OTHER SPECIFIED INTESTINAL MALABSORPTION: ICD-10-CM

## 2023-05-01 DIAGNOSIS — R41.3 MEMORY DIFFICULTIES: ICD-10-CM

## 2023-05-01 DIAGNOSIS — E78.89 LIPIDS ABNORMAL: ICD-10-CM

## 2023-05-01 DIAGNOSIS — E61.2: ICD-10-CM

## 2023-05-01 DIAGNOSIS — K90.89 OTHER INTESTINAL MALABSORPTION: ICD-10-CM

## 2023-05-01 DIAGNOSIS — R79.9 ABNORMAL BLOOD FINDINGS: Primary | ICD-10-CM

## 2023-05-01 DIAGNOSIS — R73.09 LOW GLUCOSE LEVEL: ICD-10-CM

## 2023-05-01 DIAGNOSIS — K59.01 SLOW TRANSIT CONSTIPATION: ICD-10-CM

## 2023-05-01 DIAGNOSIS — G47.9 SLEEP DIFFICULTIES: ICD-10-CM

## 2023-05-01 DIAGNOSIS — R53.83 TIRED: ICD-10-CM

## 2023-05-01 DIAGNOSIS — R52 ACHES: ICD-10-CM

## 2023-05-01 PROCEDURE — G8419 CALC BMI OUT NRM PARAM NOF/U: HCPCS | Performed by: FAMILY MEDICINE

## 2023-05-01 PROCEDURE — 99215 OFFICE O/P EST HI 40 MIN: CPT | Performed by: FAMILY MEDICINE

## 2023-05-01 PROCEDURE — 4004F PT TOBACCO SCREEN RCVD TLK: CPT | Performed by: FAMILY MEDICINE

## 2023-05-01 PROCEDURE — 3017F COLORECTAL CA SCREEN DOC REV: CPT | Performed by: FAMILY MEDICINE

## 2023-05-01 PROCEDURE — G8427 DOCREV CUR MEDS BY ELIG CLIN: HCPCS | Performed by: FAMILY MEDICINE

## 2023-05-01 NOTE — PROGRESS NOTES
properties in fat cells are responsible for enhanced glucose uptake, inhibiting hepatic HMG-CoA reductase and lowers lipids. www.jacn. org/content/20/4/327.full     But cinnamon with additives such as Cinnamon Extract are not effective as insulin mimetics.  :eStoreDirectory.at     Nutrients for Start up from [employer] or [employer] for ease to get started now;  Josie Dawkins has some useable products;  - Triple Strength Fish Oil, enteric coated  - Vit D-3 5000 IU gel caps  - Iron ferrous sulfate 325 mg tabs  - Centrum Silver look-a-like for most patients, or  - Centrum plain look-a-like if need iron    Local pharmacies or chains such as [employer], [facility], have:  - [employer] pharmaceutical grade omega 3 is 90% EPA/DHA whereas most Triple strength fish oil are 75% EPA/DHA  - Triple Strength Fish Oil (enteric coated if available) or if not enteric coated, can take from freezer for less burps  - B-50 or B-100 released balanced B complex tabs by [address] [facility] at [facility] bark 500 mg (without Chromium or extracts)   some brands list 1000 mg / serving of 2 capsules,    some brands have 1000 mg caps with the undesireable chromium extract  - Calcium carbonate/citrate, magnesium oxide/citrate, Vit D-3 as 3-4 tabs/caps/serving     Some Local Brands may contain Zinc which is acceptable for the first bottle or two  - Magnesium oxide 250 mg tabs for those having < 2 bowel movements daily  - Magnesium citrate 200 mg if having > 2 bowel movements/day  - Centrum Silver or look-a-like for most patients, Centrum plain or look-a-like with iron  - Vitamin D-3 comes as 1,000 IU or 2,000 IU or 5,000 IU gel caps or Liquid drops but keep Vitamin D levels <50 but >40     Some brands containing or derived from soy oil or corn oil are OK if not allergic to soy  - Elemental Iron 65 mg tabs at bedtime is available over the counter if need more iron     Usually turns bowel movements grey, green, or

## 2023-06-29 ENCOUNTER — NURSE ONLY (OUTPATIENT)
Dept: LAB | Age: 63
End: 2023-06-29

## 2023-07-13 LAB — CYTOLOGY THIN PREP PAP: NORMAL

## 2023-09-14 ENCOUNTER — HOSPITAL ENCOUNTER (OUTPATIENT)
Dept: MAMMOGRAPHY | Age: 63
Discharge: HOME | End: 2023-09-14
Payer: MEDICARE

## 2023-09-14 DIAGNOSIS — Z12.31 VISIT FOR SCREENING MAMMOGRAM: ICD-10-CM

## 2023-09-14 PROCEDURE — 77063 BREAST TOMOSYNTHESIS BI: CPT

## 2023-10-24 LAB
ABSOLUTE BASO #: 0.09 K/UL (ref 0–0.2)
ABSOLUTE EOS #: 0.14 K/UL (ref 0–0.5)
ABSOLUTE LYMPH #: 3.31 K/UL (ref 1–4)
ABSOLUTE MONO #: 0.88 K/UL (ref 0.2–1)
ABSOLUTE NEUT #: 5.24 K/UL (ref 1.5–7.5)
AVERAGE GLUCOSE: 111 MG/DL
BASOPHILS RELATIVE PERCENT: 0.9 %
EOSINOPHILS RELATIVE PERCENT: 1.4 %
HBA1C MFR BLD: 5.5 % (ref 4.2–5.6)
HCT VFR BLD CALC: 39.5 % (ref 34–45)
HEMOGLOBIN: 13.7 G/DL (ref 11.5–15.5)
LYMPHOCYTE %: 34.1 %
MCH RBC QN AUTO: 30.1 PG (ref 25–33)
MCHC RBC AUTO-ENTMCNC: 34.7 G/DL (ref 31–36)
MCV RBC AUTO: 86.8 FL (ref 80–99)
MONOCYTES # BLD: 9.1 %
NEUTROPHILS RELATIVE PERCENT: 53.9 %
PDW BLD-RTO: 12.2 % (ref 11.5–15)
PLATELETS: 267 K/UL (ref 130–400)
PMV BLD AUTO: 9.9 FL (ref 9.3–13)
RBC: 4.55 M/UL (ref 3.8–5.4)
SEDIMENTATION RATE, ERYTHROCYTE: 10 MM/HR (ref 0–20)
VITAMIN D 25-HYDROXY: 41 NG/ML
WBC: 9.7 K/UL (ref 3.5–11)

## 2023-10-25 LAB
CALCIUM SERPL-MCNC: 9.4 MG/DL (ref 8.5–10.5)
CHOLESTEROL/HDL RATIO: 2.9 RATIO
CHOLESTEROL: 178 MG/DL
ESTRADIOL LEVEL: <25 PG/ML
FOLLICLE STIMULATING HORMONE: 47.1 IU/L
HDLC SERPL-MCNC: 61 MG/DL
HIGH SENSITIVE C-REACTIVE PROTEIN: 0.4 MG/L
LDL CHOLESTEROL CALCULATED: 78 MG/DL
LDL/HDL RATIO: 1.3 RATIO
LH: 31.9 IU/L
MAGNESIUM: 2.3 MG/DL (ref 1.6–2.6)
PROGESTERONE LEVEL: 0.29 NG/ML
TRIGL SERPL-MCNC: 193 MG/DL
VLDLC SERPL CALC-MCNC: 39 MG/DL

## 2023-10-26 LAB
DHEAS (DHEA SULFATE): 21 UG/DL (ref 19–205)
GLIADIN ANTIBODIES IGA: <1 U/ML
GLIADIN ANTIBODIES IGG: <1 U/ML
HOMOCYSTINE, SERUM: 7 UMOL/L
PARATHYROID HORMONE INTACT: 32 PG/ML (ref 15–65)
THYROID PEROXIDASE ANTIBODY: 10 IU/ML
TISSUE TRANSGLUTAMINASE IGA: <1 U/ML
TTG, IGG: <1 U/ML

## 2023-10-28 LAB
ALBUMIN SERUM: 4.4 G/DL (ref 3.6–5.1)
DEHYDROEPIANDROSTERONE: 0.5 NG/ML (ref 1.3–9.8)
SEX HORMONE BINDING GLOBULIN: 45.8 NMOL/L (ref 17.3–125)
TESTOSTERONE FREE: 0.4 PG/ML (ref 0.6–3.8)
TESTOSTERONE, LCMS: 3 NG/DL (ref 5–32)

## 2023-11-01 ENCOUNTER — OFFICE VISIT (OUTPATIENT)
Dept: FAMILY MEDICINE CLINIC | Age: 63
End: 2023-11-01
Payer: MEDICARE

## 2023-11-01 VITALS
RESPIRATION RATE: 12 BRPM | WEIGHT: 137.4 LBS | HEIGHT: 63 IN | OXYGEN SATURATION: 95 % | SYSTOLIC BLOOD PRESSURE: 136 MMHG | BODY MASS INDEX: 24.34 KG/M2 | TEMPERATURE: 98.3 F | DIASTOLIC BLOOD PRESSURE: 78 MMHG | HEART RATE: 71 BPM

## 2023-11-01 DIAGNOSIS — R73.09 LOW GLUCOSE LEVEL: ICD-10-CM

## 2023-11-01 DIAGNOSIS — K90.89 OTHER SPECIFIED INTESTINAL MALABSORPTION: ICD-10-CM

## 2023-11-01 DIAGNOSIS — R79.89 LOW SERUM VITAMIN D: ICD-10-CM

## 2023-11-01 DIAGNOSIS — R79.9 ABNORMAL BLOOD FINDINGS: ICD-10-CM

## 2023-11-01 DIAGNOSIS — K59.01 SLOW TRANSIT CONSTIPATION: ICD-10-CM

## 2023-11-01 DIAGNOSIS — R41.3 MEMORY DIFFICULTIES: ICD-10-CM

## 2023-11-01 DIAGNOSIS — E61.2: ICD-10-CM

## 2023-11-01 DIAGNOSIS — K90.89 OTHER INTESTINAL MALABSORPTION: ICD-10-CM

## 2023-11-01 DIAGNOSIS — E78.5 ELEVATED LIPIDS: Primary | ICD-10-CM

## 2023-11-01 DIAGNOSIS — R52 ACHES: ICD-10-CM

## 2023-11-01 DIAGNOSIS — Z71.89 ENCOUNTER FOR HERB AND VITAMIN SUPPLEMENT MANAGEMENT: ICD-10-CM

## 2023-11-01 DIAGNOSIS — T45.2X1S VITAMIN D OVERDOSE, ACCIDENTAL OR UNINTENTIONAL, SEQUELA: ICD-10-CM

## 2023-11-01 DIAGNOSIS — R53.83 TIRED: ICD-10-CM

## 2023-11-01 DIAGNOSIS — H53.9 VISION ABNORMALITIES: ICD-10-CM

## 2023-11-01 DIAGNOSIS — G47.9 SLEEP DIFFICULTIES: ICD-10-CM

## 2023-11-01 PROCEDURE — G8484 FLU IMMUNIZE NO ADMIN: HCPCS | Performed by: FAMILY MEDICINE

## 2023-11-01 PROCEDURE — 4004F PT TOBACCO SCREEN RCVD TLK: CPT | Performed by: FAMILY MEDICINE

## 2023-11-01 PROCEDURE — G8427 DOCREV CUR MEDS BY ELIG CLIN: HCPCS | Performed by: FAMILY MEDICINE

## 2023-11-01 PROCEDURE — 99214 OFFICE O/P EST MOD 30 MIN: CPT | Performed by: FAMILY MEDICINE

## 2023-11-01 PROCEDURE — G8420 CALC BMI NORM PARAMETERS: HCPCS | Performed by: FAMILY MEDICINE

## 2023-11-01 PROCEDURE — 3017F COLORECTAL CA SCREEN DOC REV: CPT | Performed by: FAMILY MEDICINE

## 2023-11-01 RX ORDER — AMPICILLIN TRIHYDRATE 250 MG
2 CAPSULE ORAL
COMMUNITY

## 2023-11-01 RX ORDER — CHLORAL HYDRATE 500 MG
1 CAPSULE ORAL
COMMUNITY

## 2023-11-01 RX ORDER — BUPROPION HYDROCHLORIDE 300 MG/1
300 TABLET ORAL DAILY
COMMUNITY
Start: 2023-10-12

## 2023-11-01 RX ORDER — LISINOPRIL AND HYDROCHLOROTHIAZIDE 25; 20 MG/1; MG/1
1 TABLET ORAL DAILY
COMMUNITY
Start: 2023-09-29

## 2023-11-01 NOTE — PATIENT INSTRUCTIONS
Thank you   Thank you for trusting us with your healthcare needs. You may receive a survey regarding today's visit. It would help us out if you would take a few moments to provide your feedback. We value your input. Please bring in ALL medications BOTTLES, including inhalers, herbal supplements, over the counter, prescribed & non-prescribed medicine. The office would like actual medication bottles and a list.   Please note our OFFICE POLICIES:   Prior to getting your labs drawn, please check with your insurance company for benefits and eligibility of lab services. Often, insurance companies cover certain tests for preventative visits only. It is patient's responsibility to see what is covered. We are unable to change a diagnosis after the test has been performed. Please hold onto your original lab orders and take them to your lab to be completed. If you no show your scheduled appointment three times, you will be dismissed from this practice. Reschedules must be completed 24 hours prior to your schedule appointment. If the list below has been completed, PLEASE FAX RECORDS TO OUR OFFICE @ [phone].  Once the records have been received we will update your records at our office:  Health Maintenance Due   Topic Date Due    COVID-19 Vaccine (1) Never done    Pneumococcal 0-64 years Vaccine (1 - PCV) Never done    HIV screen  Never done    Hepatitis C screen  Never done    DTaP/Tdap/Td vaccine (1 - Tdap) Never done    Colorectal Cancer Screen  Never done    Shingles vaccine (1 of 2) Never done    Annual Wellness Visit (AWV)  Never done    Flu vaccine (1) Never done

## 2023-11-01 NOTE — PROGRESS NOTES
[address] [facility] [address] [place] [id number]  Dept: [phone]  Dept Fax: [phone]  Loc: [phone]      Criss Lema is a 65 y.o. White female. Oscar Block  presents to the [address] [place] [address] clinic today for   Chief Complaint   Patient presents with    Discuss Labs   , and;   1. Elevated lipids    2. Abnormal blood findings    3. Tired    4. Memory difficulties    5. Sleep difficulties    6. Aches    7. Vision abnormalities    8. Encounter for herb and vitamin supplement management    9. Slow transit constipation    10. Vitamin D overdose, accidental or unintentional, sequela    11. Other specified intestinal malabsorption    12. Inadequate magnesium intake    13. Low glucose level    14. Other intestinal malabsorption    15. Low serum vitamin D          I have reviewed Jennifer Gupta medical, surgical and other pertinent history in detail, and have updated medication and allergy information in the computerized patient record. Clinical Care Team:     -Referring Provider for today's consult: self  -Primary Care Provider: Pérez Dowell MD    Medical/Surgical History:   She  has a past medical history of Anxiety, Depression, Hypertension, and Restless legs syndrome. Her  has a past surgical history that includes Hysterectomy, vaginal (1995?) and Hysterectomy. Family/Social History:     Her family history includes Stroke in her maternal grandmother. She  reports that she has been smoking cigarettes. She started smoking about 43 years ago. She has a 8.75 pack-year smoking history. She has never used smokeless tobacco. She reports that she does not currently use alcohol. She reports current drug use. Drug: Marijuana Ilene Callander).     Medications/Allergies/Immunizations:     Her current medication(s) include   Current Outpatient Medications:     lisinopril-hydroCHLOROthiazide (PRINZIDE;ZESTORETIC) 20-25 MG per tablet,

## 2023-11-13 ENCOUNTER — HOSPITAL ENCOUNTER (OUTPATIENT)
Dept: WOMENS IMAGING | Age: 63
Discharge: HOME | End: 2023-11-13
Payer: MEDICARE

## 2023-11-13 DIAGNOSIS — F17.200 SMOKER: ICD-10-CM

## 2023-11-13 DIAGNOSIS — Z78.0 POSTMENOPAUSAL STATUS: ICD-10-CM

## 2023-11-13 PROCEDURE — 77080 DXA BONE DENSITY AXIAL: CPT

## 2024-07-24 ENCOUNTER — HOSPITAL ENCOUNTER (OUTPATIENT)
Dept: GENERAL RADIOLOGY | Age: 64
Discharge: HOME | End: 2024-07-24
Payer: MEDICARE

## 2024-07-24 ENCOUNTER — HOSPITAL ENCOUNTER (OUTPATIENT)
Age: 64
Discharge: HOME | End: 2024-07-24
Payer: MEDICARE

## 2024-07-24 DIAGNOSIS — M25.511 RIGHT SHOULDER PAIN, UNSPECIFIED CHRONICITY: ICD-10-CM

## 2024-07-24 DIAGNOSIS — M25.512 LEFT SHOULDER PAIN, UNSPECIFIED CHRONICITY: ICD-10-CM

## 2024-07-24 PROCEDURE — 73030 X-RAY EXAM OF SHOULDER: CPT

## 2024-09-18 ENCOUNTER — HOSPITAL ENCOUNTER (OUTPATIENT)
Dept: MAMMOGRAPHY | Age: 64
Discharge: HOME | End: 2024-09-18
Payer: MEDICARE

## 2024-09-18 VITALS — WEIGHT: 130 LBS | HEIGHT: 63 IN | BODY MASS INDEX: 23.04 KG/M2

## 2024-09-18 DIAGNOSIS — Z12.31 VISIT FOR SCREENING MAMMOGRAM: ICD-10-CM

## 2024-09-18 PROCEDURE — 77063 BREAST TOMOSYNTHESIS BI: CPT

## 2024-10-10 ENCOUNTER — HOSPITAL ENCOUNTER (OUTPATIENT)
Dept: MRI IMAGING | Age: 64
Discharge: HOME | End: 2024-10-10
Attending: FAMILY MEDICINE
Payer: MEDICARE

## 2024-10-10 DIAGNOSIS — M25.511 RIGHT SHOULDER PAIN, UNSPECIFIED CHRONICITY: ICD-10-CM

## 2024-10-10 PROCEDURE — 73221 MRI JOINT UPR EXTREM W/O DYE: CPT

## 2024-10-15 LAB
BASOPHILS ABSOLUTE: 0.05 K/UL (ref 0–0.2)
BASOPHILS RELATIVE PERCENT: 1 % (ref 0–2)
CALCIUM SERPL-MCNC: 9.1 MG/DL (ref 8.6–10.5)
CHOLESTEROL, TOTAL: 162 MG/DL (ref 100–199)
CHOLESTEROL/HDL RATIO: 2.7 (ref 2–4.5)
DHEAS (DHEA SULFATE): 121 UG/DL (ref 18.9–205)
EOSINOPHILS ABSOLUTE: 0.16 K/UL (ref 0–0.8)
EOSINOPHILS RELATIVE PERCENT: 3.2 % (ref 0–5)
ESTIMATED AVERAGE GLUCOSE: 111 MG/DL
ESTRADIOL LEVEL: <5 PG/ML
FOLLICLE STIMULATING HORMONE: 52 MIU/ML
HBA1C MFR BLD: 5.5 %
HCT VFR BLD CALC: 41 % (ref 35–47)
HDLC SERPL-MCNC: 61 MG/DL
HEMOGLOBIN: 13.8 G/DL (ref 11.9–16)
HIGH SENSITIVE C-REACTIVE PROTEIN: 1.1 MG/L
IMMATURE GRANS (ABS): 0 K/UL (ref 0–0.06)
IMMATURE GRANULOCYTES %: 0 % (ref 0–2)
LDL CHOLESTEROL: 84 MG/DL
LDL/HDL RATIO: 1.4
LH: 30.7 MIU/ML
LYMPHOCYTES ABSOLUTE: 1.57 K/UL (ref 0.9–5.2)
LYMPHOCYTES RELATIVE PERCENT: 31.3 % (ref 20–45)
MAGNESIUM: 2.4 MG/DL (ref 1.6–2.4)
MCH RBC QN AUTO: 29.9 PG (ref 26–33)
MCHC RBC AUTO-ENTMCNC: 33.7 G/DL (ref 32–35)
MCV RBC AUTO: 89 FL (ref 75–100)
MONOCYTES ABSOLUTE: 0.62 K/UL (ref 0.1–1)
MONOCYTES RELATIVE PERCENT: 12.4 % (ref 0–13)
NEUTROPHILS ABSOLUTE: 2.62 K/UL (ref 1.9–8)
NEUTROPHILS RELATIVE PERCENT: 52.1 % (ref 45–75)
PDW BLD-RTO: 12.3 % (ref 11.2–14.8)
PLATELET # BLD: 203 THOUS/CMM (ref 140–440)
PROGESTERONE LEVEL: 0.1 NG/ML
PTH INTACT: 30 PG/ML (ref 12–65)
RBC # BLD: 4.62 MILL/CMM (ref 3.8–5.2)
SED RATE, AUTOMATED: 14 MM/HR (ref 0–29)
THYROID PEROXIDASE ANTIBODY: <15 IU/ML
TISSUE TRANSGLUTAMINASE IGA: <0.5 U/ML
TRIGL SERPL-MCNC: 84 MG/DL (ref 20–149)
TTG, IGG: <0.8 U/ML
VITAMIN D 25-HYDROXY: 46.4 NG/ML (ref 30–100)
VLDLC SERPL CALC-MCNC: 17 MG/DL
WBC # BLD: 5 THDS/CMM (ref 3.6–11)

## 2024-10-16 LAB
CHOLESTEROL, TOTAL: 162 MG/DL
CHOLESTEROL/HDL RATIO: NORMAL
DEHYDROEPIANDROSTERONE: 27.8 NG/ML (ref 1.3–9.8)
ESTIMATED AVERAGE GLUCOSE: NORMAL
HBA1C MFR BLD: 5.5 %
HDLC SERPL-MCNC: 61 MG/DL (ref 35–70)
LDL CHOLESTEROL: 84
NONHDLC SERPL-MCNC: NORMAL MG/DL
TRIGL SERPL-MCNC: 84 MG/DL
VLDLC SERPL CALC-MCNC: 17 MG/DL

## 2024-10-18 LAB
ALBUMIN: 3.8 G/DL (ref 3.6–5.1)
SEX HORMONE BINDING GLOBULIN: 64.3 NMOL/L (ref 17.3–125)
TESTOSTERONE FREE: 2 PG/ML (ref 0.6–3.8)
TESTOSTERONE, LCMS: 17 NG/DL (ref 5–32)

## 2024-10-21 NOTE — PROGRESS NOTES
produce with ethylene to ripen; not commonly done in [place] since fruit is tree-ripened there. Does treatment of food with ethylene induce banana proteins that cross-react with latex? (Marcia et al.)    References:   Latex in Foods Allergy, http://ehp.niehs.nih.gov/members/2003/5811/5811.html    Search web for \" What’s in Season \" for where you live or are at the time you food shop   Management of Latex, :[email or web address]  search for nih, latex-like proteins in foods

## 2024-10-22 ENCOUNTER — OFFICE VISIT (OUTPATIENT)
Dept: FAMILY MEDICINE CLINIC | Age: 64
End: 2024-10-22
Payer: MEDICARE

## 2024-10-22 VITALS
BODY MASS INDEX: 23.35 KG/M2 | DIASTOLIC BLOOD PRESSURE: 82 MMHG | TEMPERATURE: 98.3 F | WEIGHT: 131.8 LBS | SYSTOLIC BLOOD PRESSURE: 130 MMHG | RESPIRATION RATE: 10 BRPM | OXYGEN SATURATION: 96 % | HEART RATE: 73 BPM | HEIGHT: 63 IN

## 2024-10-22 DIAGNOSIS — R52 ACHES: ICD-10-CM

## 2024-10-22 DIAGNOSIS — E61.2: ICD-10-CM

## 2024-10-22 DIAGNOSIS — R79.9 ABNORMAL BLOOD FINDINGS: ICD-10-CM

## 2024-10-22 DIAGNOSIS — R73.09 LOW GLUCOSE LEVEL: ICD-10-CM

## 2024-10-22 DIAGNOSIS — G47.9 SLEEP DIFFICULTIES: ICD-10-CM

## 2024-10-22 DIAGNOSIS — E78.5 ELEVATED LIPIDS: Primary | ICD-10-CM

## 2024-10-22 DIAGNOSIS — T45.2X1S VITAMIN D OVERDOSE, ACCIDENTAL OR UNINTENTIONAL, SEQUELA: ICD-10-CM

## 2024-10-22 DIAGNOSIS — K90.89 OTHER INTESTINAL MALABSORPTION: ICD-10-CM

## 2024-10-22 DIAGNOSIS — K90.89 OTHER SPECIFIED INTESTINAL MALABSORPTION: ICD-10-CM

## 2024-10-22 DIAGNOSIS — E78.89 LIPIDS ABNORMAL: ICD-10-CM

## 2024-10-22 DIAGNOSIS — R53.83 TIRED: ICD-10-CM

## 2024-10-22 DIAGNOSIS — H53.9 VISION ABNORMALITIES: ICD-10-CM

## 2024-10-22 DIAGNOSIS — Z71.89 ENCOUNTER FOR HERB AND VITAMIN SUPPLEMENT MANAGEMENT: ICD-10-CM

## 2024-10-22 DIAGNOSIS — R41.3 MEMORY DIFFICULTIES: ICD-10-CM

## 2024-10-22 DIAGNOSIS — K59.01 SLOW TRANSIT CONSTIPATION: ICD-10-CM

## 2024-10-22 PROCEDURE — 3017F COLORECTAL CA SCREEN DOC REV: CPT | Performed by: FAMILY MEDICINE

## 2024-10-22 PROCEDURE — G8427 DOCREV CUR MEDS BY ELIG CLIN: HCPCS | Performed by: FAMILY MEDICINE

## 2024-10-22 PROCEDURE — 4004F PT TOBACCO SCREEN RCVD TLK: CPT | Performed by: FAMILY MEDICINE

## 2024-10-22 PROCEDURE — G8484 FLU IMMUNIZE NO ADMIN: HCPCS | Performed by: FAMILY MEDICINE

## 2024-10-22 PROCEDURE — 99213 OFFICE O/P EST LOW 20 MIN: CPT | Performed by: FAMILY MEDICINE

## 2024-10-22 PROCEDURE — G8420 CALC BMI NORM PARAMETERS: HCPCS | Performed by: FAMILY MEDICINE

## 2024-10-22 RX ORDER — PHENOL 1.4 %
1 AEROSOL, SPRAY (ML) MUCOUS MEMBRANE 2 TIMES DAILY
COMMUNITY

## 2024-10-22 SDOH — ECONOMIC STABILITY: INCOME INSECURITY: HOW HARD IS IT FOR YOU TO PAY FOR THE VERY BASICS LIKE FOOD, HOUSING, MEDICAL CARE, AND HEATING?: VERY HARD

## 2024-10-22 SDOH — ECONOMIC STABILITY: TRANSPORTATION INSECURITY
IN THE PAST 12 MONTHS, HAS LACK OF TRANSPORTATION KEPT YOU FROM MEETINGS, WORK, OR FROM GETTING THINGS NEEDED FOR DAILY LIVING?: NO

## 2024-10-22 SDOH — ECONOMIC STABILITY: FOOD INSECURITY: WITHIN THE PAST 12 MONTHS, THE FOOD YOU BOUGHT JUST DIDN'T LAST AND YOU DIDN'T HAVE MONEY TO GET MORE.: OFTEN TRUE

## 2024-10-22 SDOH — ECONOMIC STABILITY: FOOD INSECURITY: WITHIN THE PAST 12 MONTHS, YOU WORRIED THAT YOUR FOOD WOULD RUN OUT BEFORE YOU GOT MONEY TO BUY MORE.: SOMETIMES TRUE

## 2024-10-22 ASSESSMENT — PATIENT HEALTH QUESTIONNAIRE - PHQ9
SUM OF ALL RESPONSES TO PHQ QUESTIONS 1-9: 22
9. THOUGHTS THAT YOU WOULD BE BETTER OFF DEAD, OR OF HURTING YOURSELF: NOT AT ALL
8. MOVING OR SPEAKING SO SLOWLY THAT OTHER PEOPLE COULD HAVE NOTICED. OR THE OPPOSITE, BEING SO FIGETY OR RESTLESS THAT YOU HAVE BEEN MOVING AROUND A LOT MORE THAN USUAL: MORE THAN HALF THE DAYS
SUM OF ALL RESPONSES TO PHQ QUESTIONS 1-9: 22
8. MOVING OR SPEAKING SO SLOWLY THAT OTHER PEOPLE COULD HAVE NOTICED. OR THE OPPOSITE - BEING SO FIDGETY OR RESTLESS THAT YOU HAVE BEEN MOVING AROUND A LOT MORE THAN USUAL: MORE THAN HALF THE DAYS
7. TROUBLE CONCENTRATING ON THINGS, SUCH AS READING THE NEWSPAPER OR WATCHING TELEVISION: NEARLY EVERY DAY
SUM OF ALL RESPONSES TO PHQ QUESTIONS 1-9: 22
10. IF YOU CHECKED OFF ANY PROBLEMS, HOW DIFFICULT HAVE THESE PROBLEMS MADE IT FOR YOU TO DO YOUR WORK, TAKE CARE OF THINGS AT HOME, OR GET ALONG WITH OTHER PEOPLE: SOMEWHAT DIFFICULT
SUM OF ALL RESPONSES TO PHQ QUESTIONS 1-9: 22
SUM OF ALL RESPONSES TO PHQ9 QUESTIONS 1 & 2: 5
6. FEELING BAD ABOUT YOURSELF - OR THAT YOU ARE A FAILURE OR HAVE LET YOURSELF OR YOUR FAMILY DOWN: NEARLY EVERY DAY
5. POOR APPETITE OR OVEREATING: NEARLY EVERY DAY
7. TROUBLE CONCENTRATING ON THINGS, SUCH AS READING THE NEWSPAPER OR WATCHING TELEVISION: NEARLY EVERY DAY
3. TROUBLE FALLING OR STAYING ASLEEP: NEARLY EVERY DAY
2. FEELING DOWN, DEPRESSED OR HOPELESS: MORE THAN HALF THE DAYS
6. FEELING BAD ABOUT YOURSELF - OR THAT YOU ARE A FAILURE OR HAVE LET YOURSELF OR YOUR FAMILY DOWN: NEARLY EVERY DAY
1. LITTLE INTEREST OR PLEASURE IN DOING THINGS: NEARLY EVERY DAY
SUM OF ALL RESPONSES TO PHQ9 QUESTIONS 1 & 2: 5
1. LITTLE INTEREST OR PLEASURE IN DOING THINGS: NEARLY EVERY DAY
3. TROUBLE FALLING OR STAYING ASLEEP: NEARLY EVERY DAY
2. FEELING DOWN, DEPRESSED OR HOPELESS: MORE THAN HALF THE DAYS
4. FEELING TIRED OR HAVING LITTLE ENERGY: NEARLY EVERY DAY
SUM OF ALL RESPONSES TO PHQ QUESTIONS 1-9: 22
4. FEELING TIRED OR HAVING LITTLE ENERGY: NEARLY EVERY DAY
10. IF YOU CHECKED OFF ANY PROBLEMS, HOW DIFFICULT HAVE THESE PROBLEMS MADE IT FOR YOU TO DO YOUR WORK, TAKE CARE OF THINGS AT HOME, OR GET ALONG WITH OTHER PEOPLE: SOMEWHAT DIFFICULT
5. POOR APPETITE OR OVEREATING: NEARLY EVERY DAY
9. THOUGHTS THAT YOU WOULD BE BETTER OFF DEAD, OR OF HURTING YOURSELF: NOT AT ALL

## 2024-10-22 ASSESSMENT — COLUMBIA-SUICIDE SEVERITY RATING SCALE - C-SSRS
2. IN THE PAST MONTH, HAVE YOU ACTUALLY HAD ANY THOUGHTS OF KILLING YOURSELF?: NO
1. IN THE PAST MONTH, HAVE YOU WISHED YOU WERE DEAD OR WISHED YOU COULD GO TO SLEEP AND NOT WAKE UP?: NO
6. IN YOUR LIFETIME, HAVE YOU EVER DONE ANYTHING, STARTED TO DO ANYTHING, OR PREPARED TO DO ANYTHING TO END YOUR LIFE?: NO

## 2024-10-22 NOTE — PATIENT INSTRUCTIONS
input.  Please bring in ALL medications BOTTLES, including inhalers, herbal supplements, over the counter, prescribed & non-prescribed medicine. The office would like actual medication bottles and a list.   Please note our OFFICE POLICIES:   A.  While Dr. Biggs is in [place], you may have a Video Visit with him, as long as you are in the state of [place] or [place].  You must have a PCP listed on your account.  If you do not,you will not be able to have an appointment virtually with him.  B.  Dr. Biggs is only licensed in [place] and [place].  You must be in one of these states to do a video visit.  C.  We are unable to change a diagnosis AFTER the test has been performed.          4.  Prior to getting your labs drawn, check with your insurance company for benefits and eligibility of lab services.  Often, insurance companies cover certain tests for preventative visits only.  It is patient's responsibility to    see what is covered.    5.  If the list below has been completed, PLEASE FAX RECORDS TO OUR OFFICE @ [phone]. Once the records have been received we will update your records at our office:  Health Maintenance Due   Topic Date Due    Pneumococcal 0-64 years Vaccine (1 of 2 - PCV) Never done    HIV screen  Never done    Hepatitis C screen  Never done    DTaP/Tdap/Td vaccine (1 - Tdap) Never done    Colorectal Cancer Screen  Never done    Shingles vaccine (1 of 2) Never done    Respiratory Syncytial Virus (RSV) Pregnant or age 60 yrs+ (1 - 1-dose 60+ series) Never done    Annual Wellness Visit (Medicare Advantage)  Never done    Flu vaccine (1) Never done    COVID-19 Vaccine (1 - 2023-24 season) Never done

## 2025-01-08 LAB
BASOPHILS ABSOLUTE: 0.07 K/UL (ref 0–0.2)
BASOPHILS RELATIVE PERCENT: 1.1 % (ref 0–2)
CALCIUM SERPL-MCNC: 8.9 MG/DL (ref 8.6–10.5)
DEHYDROEPIANDROSTERONE: 0.1 NG/ML (ref 1.3–9.8)
DHEAS (DHEA SULFATE): 37.5 UG/DL (ref 18.9–205)
EOSINOPHILS ABSOLUTE: 0.06 K/UL (ref 0–0.8)
EOSINOPHILS RELATIVE PERCENT: 0.9 % (ref 0–5)
HCT VFR BLD CALC: 43.2 % (ref 35–47)
HEMOGLOBIN: 14.7 G/DL (ref 11.9–16)
HIGH SENSITIVE C-REACTIVE PROTEIN: 0.9 MG/L
IMMATURE GRANS (ABS): 0.01 K/UL (ref 0–0.06)
IMMATURE GRANULOCYTES %: 0.2 % (ref 0–2)
LYMPHOCYTES ABSOLUTE: 1.79 K/UL (ref 0.9–5.2)
LYMPHOCYTES RELATIVE PERCENT: 27.2 % (ref 20–45)
MAGNESIUM: 2.7 MG/DL (ref 1.6–2.6)
MCH RBC QN AUTO: 30.5 PG (ref 26–33)
MCHC RBC AUTO-ENTMCNC: 34 G/DL (ref 32–35)
MCV RBC AUTO: 90 FL (ref 75–100)
MONOCYTES ABSOLUTE: 0.6 K/UL (ref 0.1–1)
MONOCYTES RELATIVE PERCENT: 9.1 % (ref 0–13)
NEUTROPHILS ABSOLUTE: 4.06 K/UL (ref 1.9–8)
NEUTROPHILS RELATIVE PERCENT: 61.5 % (ref 45–75)
PDW BLD-RTO: 12.1 % (ref 11.2–14.8)
PLATELET # BLD: 216 THOUS/CMM (ref 140–440)
PTH INTACT: 26 PG/ML (ref 12–65)
RBC # BLD: 4.82 MILL/CMM (ref 3.8–5.2)
SED RATE, AUTOMATED: 11 MM/HR (ref 0–29)
VITAMIN D 25-HYDROXY: 61.8 NG/ML (ref 30–100)
WBC # BLD: 6.6 THDS/CMM (ref 3.6–11)

## 2025-01-09 ENCOUNTER — TELEPHONE (OUTPATIENT)
Dept: FAMILY MEDICINE CLINIC | Age: 65
End: 2025-01-09

## 2025-01-13 LAB
ALBUMIN: 4.5 G/DL (ref 3.6–5.1)
SEX HORMONE BINDING GLOBULIN: 74 NMOL/L (ref 17.3–125)
TESTOSTERONE FREE: 0.6 PG/ML (ref 0.6–3.8)
TESTOSTERONE, LCMS: 6 NG/DL (ref 5–32)

## 2025-03-25 ENCOUNTER — HOSPITAL ENCOUNTER (OUTPATIENT)
Age: 65
Discharge: HOME | End: 2025-03-27
Attending: FAMILY MEDICINE
Payer: MEDICARE

## 2025-03-25 VITALS — BODY MASS INDEX: 25.52 KG/M2 | HEIGHT: 60 IN | WEIGHT: 130 LBS

## 2025-03-25 DIAGNOSIS — Z01.810 PREPROCEDURAL CARDIOVASCULAR EXAMINATION: ICD-10-CM

## 2025-03-25 DIAGNOSIS — I10 HYPERTENSION, UNSPECIFIED TYPE: ICD-10-CM

## 2025-03-25 DIAGNOSIS — F17.210 NICOTINE DEPENDENCE, CIGARETTES, UNCOMPLICATED: ICD-10-CM

## 2025-03-25 DIAGNOSIS — R07.89 OTHER CHEST PAIN: ICD-10-CM

## 2025-03-25 PROCEDURE — 93017 CV STRESS TEST TRACING ONLY: CPT

## 2025-03-26 LAB
ECHO BSA: 1.58 M2
STRESS BASELINE DIAS BP: 97 MMHG
STRESS BASELINE HR: 120 BPM
STRESS BASELINE SYS BP: 119 MMHG
STRESS ESTIMATED WORKLOAD: 7 METS
STRESS EXERCISE DUR MIN: 6 MIN
STRESS EXERCISE DUR SEC: 0 SEC
STRESS PEAK DIAS BP: 101 MMHG
STRESS PEAK SYS BP: 184 MMHG
STRESS PERCENT HR ACHIEVED: 99 %
STRESS POST PEAK HR: 155 BPM
STRESS RATE PRESSURE PRODUCT: NORMAL BPM*MMHG
STRESS STAGE 1 BP: NORMAL MMHG
STRESS STAGE 1 DURATION: 3 MIN:SEC
STRESS STAGE 1 HR: 139 BPM
STRESS STAGE 2 BP: NORMAL MMHG
STRESS STAGE 2 DURATION: 3 MIN:SEC
STRESS STAGE 2 HR: 155 BPM
STRESS STAGE 3 COMMENTS: NORMAL
STRESS STAGE RECOVERY 1 BP: NORMAL MMHG
STRESS STAGE RECOVERY 1 DURATION: 1 MIN:SEC
STRESS STAGE RECOVERY 1 HR: 155 BPM
STRESS STAGE RECOVERY 2 BP: NORMAL MMHG
STRESS STAGE RECOVERY 2 COMMENTS: NORMAL
STRESS STAGE RECOVERY 2 DURATION: 1 MIN:SEC
STRESS STAGE RECOVERY 2 HR: 129 BPM
STRESS STAGE RECOVERY 3 BP: NORMAL MMHG
STRESS STAGE RECOVERY 3 COMMENTS: NORMAL
STRESS STAGE RECOVERY 3 DURATION: 1 MIN:SEC
STRESS STAGE RECOVERY 3 HR: 127 BPM
STRESS STAGE RECOVERY 4 BP: NORMAL MMHG
STRESS STAGE RECOVERY 4 COMMENTS: NORMAL
STRESS STAGE RECOVERY 4 DURATION: 2 MIN:SEC
STRESS STAGE RECOVERY 4 HR: 121 BPM
STRESS STANDING DIAS BP: 97 MMHG
STRESS STANDING HR: 118 BPM
STRESS STANDING SYS BP: 119 MMHG
STRESS TARGET HR: 157 BPM

## 2025-03-26 PROCEDURE — 93018 CV STRESS TEST I&R ONLY: CPT | Performed by: INTERNAL MEDICINE

## 2025-03-26 PROCEDURE — 93016 CV STRESS TEST SUPVJ ONLY: CPT | Performed by: INTERNAL MEDICINE
